# Patient Record
Sex: MALE | Race: WHITE
[De-identification: names, ages, dates, MRNs, and addresses within clinical notes are randomized per-mention and may not be internally consistent; named-entity substitution may affect disease eponyms.]

---

## 2019-04-10 ENCOUNTER — HOSPITAL ENCOUNTER (EMERGENCY)
Dept: HOSPITAL 18 - NAV ERS | Age: 39
Discharge: HOME | End: 2019-04-10
Payer: COMMERCIAL
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
## 2019-04-10 DIAGNOSIS — S93.402A: Primary | ICD-10-CM

## 2019-04-10 NOTE — RAD
LEFT ANKLE THREE VIEWS:

4/10/19

 

HISTORY: 

Left ankle pain. Injury, trauma. 

 

FINDINGS/IMPRESSION:  

No fracture or dislocation is identified. The ankle mortise is maintained. 

 

POS: FRANSISCO